# Patient Record
(demographics unavailable — no encounter records)

---

## 2024-12-11 NOTE — DISCUSSION/SUMMARY
[FreeTextEntry1] : This is a 13-year-old female patient that is here today for evaluation of ongoing body rash which started approximately 3 weeks ago.Approximately 1 week ago she developed a sore throat and upper respiratory infection.  Throat culture was obtained at that time which was negative for strep.Mom started her on Zyrtec during the day and Benadryl at night.  She has a past history of [place] area occurring after a viral illness about 5 years ago.She has also had intermittent lip swelling which resolves with the antihistamines.She began a mild cough earlier this week and was started on Zithromax.  Due to the possibility of mycoplasma infection based on rash and cough.RVP was obtained today.Her physical examination today is negative other than for blotchy rash which appears to be fading on her arms abdomen and upper chest.  Her legs are free of rash she has a blotchy facial rash of which mom states is often present due to dryness .  She is afebrile and in no distress.  There is no difficulty breathing her lungs are clear bilaterally and her HEENT exam is within normal limits.  She does complain of tiredness but there is no shortness of breath nor any joint swelling.  Based on clinical findings suggestive of possible mycoplasma infection due to rash and cough she has a ready been started on Zithromax we will await nasal swab results to determine whether or not needs to complete course of antibiotics.  Should rash increase or fail to continue to improve over the next week mom to contact office for further evaluation and possible referral to allergy immunology. Total time dedicated to this patient's visit includes preparing to see patient  obtaining and/or reviewing separately obtained history from patient and parent,  discussing symptoms ,  physical exam and medication recommendations Time :25

## 2024-12-11 NOTE — HISTORY OF PRESENT ILLNESS
[FreeTextEntry6] : This is a 13-year-old female that is here today for evaluation of ongoing body rash for the past 3 weeks. States sore throat and URI 1 week ago which rapid strep was negative.

## 2024-12-11 NOTE — PHYSICAL EXAM
[Tired appearing] : tired appearing [NL] : soft, nontender, nondistended, normal bowel sounds, no hepatosplenomegaly [Face] : face [Trunk] : trunk [Arms] : arms [de-identified] : Blotchy hive-like rash present on the upper chest abdomen and arms appears to be fading and responding to antihistamine face also has blotchy rash which mom states is child normal

## 2025-01-09 NOTE — PHYSICAL EXAM

## 2025-01-09 NOTE — DISCUSSION/SUMMARY
[Normal Growth] : growth [Normal Development] : development  [No Elimination Concerns] : elimination [Continue Regimen] : feeding [No Skin Concerns] : skin [Normal Sleep Pattern] : sleep [None] : no medical problems [Anticipatory Guidance Given] : Anticipatory guidance addressed as per the history of present illness section [Physical Growth and Development] : physical growth and development [Social and Academic Competence] : social and academic competence [Emotional Well-Being] : emotional well-being [Risk Reduction] : risk reduction [Violence and Injury Prevention] : violence and injury prevention [No Vaccines] : no vaccines needed [No Medications] : ~He/She~ is not on any medications [Patient] : patient [Parent/Guardian] : Parent/Guardian [Full Activity without restrictions including Physical Education & Athletics] : Full Activity without restrictions including Physical Education & Athletics [] : The components of the vaccine(s) to be administered today are listed in the plan of care. The disease(s) for which the vaccine(s) are intended to prevent and the risks have been discussed with the caretaker.  The risks are also included in the appropriate vaccination information statements which have been provided to the patient's caregiver.  The caregiver has given consent to vaccinate. [FreeTextEntry1] : 14 year female here for well visit. Normal growth and development observed unless otherwise listed. Continue balanced diet with all food groups. Brush teeth twice a day with toothbrush. Recommend visit to dentist. Help child to maintain consistent daily routines and sleep schedule. Personal hygiene and puberty explained. School discussed. Ensure home is safe. Teach child about personal safety. Use consistent, positive discipline. Limit screen time to no more than 2 hours per day. Encourage physical activity-- recommend at least an hour per day. Return 1 year for routine well child check.    Passed hearing test in office today.  Passed vision screening in office today.   Vaccine Information Sheet(s) given for appropriate vaccines. The components of the vaccine(s) to be administered today are listed in the plan of care. We discussed common side effects and education on the vaccine was provided including the disease(s) for which the vaccine(s) are intended to prevent as well as any risks. Denies any questions. Consent was given to vaccinate. HPV #1 given in right arm. Needs 2nd hep A but will return for it.  HUSSEIN substance screening administered and scanned into chart. Counseling provided. Passed.   Counselled on tracking period in an angely or on a calendar etc.

## 2025-01-09 NOTE — HISTORY OF PRESENT ILLNESS
[Mother] : mother [Normal] : normal [Eats meals with family] : eats meals with family [Has family members/adults to turn to for help] : has family members/adults to turn to for help [Is permitted and is able to make independent decisions] : Is permitted and is able to make independent decisions [Grade: ____] : Grade: [unfilled] [Normal Performance] : normal performance [Normal Behavior/Attention] : normal behavior/attention [Normal Homework] : normal homework [Has friends] : has friends [At least 1 hour of physical activity a day] : at least 1 hour of physical activity a day [Screen time (except homework) less than 2 hours a day] : screen time (except homework) less than 2 hours a day [Has interests/participates in community activities/volunteers] : has interests/participates in community activities/volunteers. [Uses safety belts/safety equipment] : uses safety belts/safety equipment  [Has peer relationships free of violence] : has peer relationships free of violence [Has ways to cope with stress] : has ways to cope with stress [Displays self-confidence] : displays self-confidence [With Teen] : teen [Yes] : Patient goes to dentist yearly [Needs Immunizations] : Needs immunizations [Heavy Bleeding] : no heavy bleeding [Sleep Concerns] : no sleep concerns [Eats regular meals including adequate fruits and vegetables] : eats regular meals including adequate fruits and vegetables [Drinks non-sweetened liquids] : drinks non-sweetened liquids  [Calcium source] : calcium source [Has concerns about body or appearance] : does not have concerns about body or appearance [Uses electronic nicotine delivery system] : does not use electronic nicotine delivery system [Exposure to electronic nicotine delivery system] : no exposure to electronic nicotine delivery system [Uses tobacco] : does not use tobacco [Exposure to tobacco] : no exposure to tobacco [Uses drugs] : does not use drugs  [Exposure to drugs] : no exposure to drugs [Drinks alcohol] : does not drink alcohol [Exposure to alcohol] : no exposure to alcohol [Impaired/distracted driving] : no impaired/distracted driving [No] : Patient has not had sexual intercourse [Has problems with sleep] : does not have problems with sleep [Gets depressed, anxious, or irritable/has mood swings] : does not get depressed, anxious, or irritable/has mood swings [Has thought about hurting self or considered suicide] : has not thought about hurting self or considered suicide [FreeTextEntry8] : First period in August or September. Has had it once since then. Does not track it and is very secretive about her period, won't tell mom  [de-identified] : Dance, cheerleading for basketball and football. Hangs out friends [de-identified] : IEP in school. Fantastic grades [FreeTextEntry1] : 14 year old female here for well visit. Denies any specialist visits, ER visits, hospitalizations or serious injuries since last well visit unless listed below.  2018- serum sickness-like reaction with urticaria and lip swelling/angioedema. Had this happen again recently last month but was believed to be related to Coronavirus infection and possible mycoplasma exposure (treated with zithromax and improved).